# Patient Record
Sex: FEMALE | Race: WHITE | NOT HISPANIC OR LATINO | Employment: OTHER | URBAN - METROPOLITAN AREA
[De-identification: names, ages, dates, MRNs, and addresses within clinical notes are randomized per-mention and may not be internally consistent; named-entity substitution may affect disease eponyms.]

---

## 2022-04-07 ENCOUNTER — HISTORICAL (OUTPATIENT)
Dept: ADMINISTRATIVE | Facility: HOSPITAL | Age: 82
End: 2022-04-07

## 2022-04-07 LAB
ABS NEUT (OLG): 3.21 (ref 2.1–9.2)
BASOPHILS # BLD AUTO: 0 10*3/UL (ref 0–0.2)
BASOPHILS NFR BLD AUTO: 1 %
BUN SERPL-MCNC: 16.5 MG/DL (ref 9.8–20.1)
CALCIUM SERPL-MCNC: 9.5 MG/DL (ref 8.7–10.5)
CHLORIDE SERPL-SCNC: 108 MMOL/L (ref 98–107)
CHOLEST SERPL-MCNC: 213 MG/DL
CHOLEST/HDLC SERPL: 3 {RATIO} (ref 0–5)
CO2 SERPL-SCNC: 26 MMOL/L (ref 23–31)
CREAT SERPL-MCNC: 0.65 MG/DL (ref 0.55–1.02)
CREAT/UREA NIT SERPL: 25
EOSINOPHIL # BLD AUTO: 0.2 10*3/UL (ref 0–0.9)
EOSINOPHIL NFR BLD AUTO: 4 %
ERYTHROCYTE [DISTWIDTH] IN BLOOD BY AUTOMATED COUNT: 12.9 % (ref 11.5–17)
GLUCOSE SERPL-MCNC: 91 MG/DL (ref 82–115)
HCT VFR BLD AUTO: 44.7 % (ref 37–47)
HDLC SERPL-MCNC: 69 MG/DL (ref 35–60)
HEMOLYSIS INTERF INDEX SERPL-ACNC: 20
HGB BLD-MCNC: 14.7 G/DL (ref 12–16)
ICTERIC INTERF INDEX SERPL-ACNC: 1
LDLC SERPL CALC-MCNC: 129 MG/DL (ref 50–140)
LIPEMIC INTERF INDEX SERPL-ACNC: 1
LYMPHOCYTES # BLD AUTO: 1.5 10*3/UL (ref 0.6–4.6)
LYMPHOCYTES NFR BLD AUTO: 27 %
MANUAL DIFF? (OHS): NO
MCH RBC QN AUTO: 30.4 PG (ref 27–31)
MCHC RBC AUTO-ENTMCNC: 32.9 G/DL (ref 33–36)
MCV RBC AUTO: 92.5 FL (ref 80–94)
MONOCYTES # BLD AUTO: 0.5 10*3/UL (ref 0.1–1.3)
MONOCYTES NFR BLD AUTO: 8 %
NEUTROPHILS # BLD AUTO: 3.21 10*3/UL (ref 2.1–9.2)
NEUTROPHILS NFR BLD AUTO: 59 %
PLATELET # BLD AUTO: 129 10*3/UL (ref 130–400)
PMV BLD AUTO: 10.6 FL (ref 9.4–12.4)
POTASSIUM SERPL-SCNC: 4.6 MMOL/L (ref 3.5–5.1)
RBC # BLD AUTO: 4.83 10*6/UL (ref 4.2–5.4)
SODIUM SERPL-SCNC: 143 MMOL/L (ref 136–145)
TRIGL SERPL-MCNC: 75 MG/DL (ref 37–140)
VLDLC SERPL CALC-MCNC: 15 MG/DL
WBC # SPEC AUTO: 5.4 10*3/UL (ref 4.5–11.5)

## 2022-04-19 ENCOUNTER — HISTORICAL (OUTPATIENT)
Dept: RADIOLOGY | Facility: HOSPITAL | Age: 82
End: 2022-04-19

## 2022-05-11 DIAGNOSIS — N30.90 CYSTITIS: Primary | ICD-10-CM

## 2022-05-11 RX ORDER — ALENDRONATE SODIUM 70 MG/1
70 TABLET ORAL
Qty: 4 TABLET | Refills: 3 | Status: SHIPPED | OUTPATIENT
Start: 2022-05-11 | End: 2022-06-09 | Stop reason: SDUPTHER

## 2022-05-12 RX ORDER — ESCITALOPRAM OXALATE 10 MG/1
10 TABLET ORAL DAILY
Qty: 30 TABLET | Refills: 1 | Status: SHIPPED | OUTPATIENT
Start: 2022-05-12 | End: 2022-06-09 | Stop reason: SDUPTHER

## 2022-05-12 NOTE — PROGRESS NOTES
Patient called, states she is experiencing depression due to patient's  being severely ill. Would like an antidepressant to be prescribed  Will send to pharmacy and patient will follow up in clinic when able to.

## 2022-06-09 ENCOUNTER — OFFICE VISIT (OUTPATIENT)
Dept: INTERNAL MEDICINE | Facility: CLINIC | Age: 82
End: 2022-06-09
Payer: MEDICARE

## 2022-06-09 VITALS
WEIGHT: 155 LBS | DIASTOLIC BLOOD PRESSURE: 58 MMHG | BODY MASS INDEX: 24.33 KG/M2 | RESPIRATION RATE: 18 BRPM | OXYGEN SATURATION: 95 % | HEIGHT: 67 IN | HEART RATE: 73 BPM | SYSTOLIC BLOOD PRESSURE: 124 MMHG

## 2022-06-09 DIAGNOSIS — M81.8 OTHER OSTEOPOROSIS WITHOUT CURRENT PATHOLOGICAL FRACTURE: ICD-10-CM

## 2022-06-09 DIAGNOSIS — F51.01 PRIMARY INSOMNIA: Chronic | ICD-10-CM

## 2022-06-09 DIAGNOSIS — F41.9 ANXIETY: ICD-10-CM

## 2022-06-09 DIAGNOSIS — R32 URINARY INCONTINENCE, UNSPECIFIED TYPE: ICD-10-CM

## 2022-06-09 PROCEDURE — 1160F RVW MEDS BY RX/DR IN RCRD: CPT | Mod: CPTII,,, | Performed by: STUDENT IN AN ORGANIZED HEALTH CARE EDUCATION/TRAINING PROGRAM

## 2022-06-09 PROCEDURE — 99214 PR OFFICE/OUTPT VISIT, EST, LEVL IV, 30-39 MIN: ICD-10-PCS | Mod: ,,, | Performed by: STUDENT IN AN ORGANIZED HEALTH CARE EDUCATION/TRAINING PROGRAM

## 2022-06-09 PROCEDURE — 1101F PT FALLS ASSESS-DOCD LE1/YR: CPT | Mod: CPTII,,, | Performed by: STUDENT IN AN ORGANIZED HEALTH CARE EDUCATION/TRAINING PROGRAM

## 2022-06-09 PROCEDURE — 1159F PR MEDICATION LIST DOCUMENTED IN MEDICAL RECORD: ICD-10-PCS | Mod: CPTII,,, | Performed by: STUDENT IN AN ORGANIZED HEALTH CARE EDUCATION/TRAINING PROGRAM

## 2022-06-09 PROCEDURE — 1160F PR REVIEW ALL MEDS BY PRESCRIBER/CLIN PHARMACIST DOCUMENTED: ICD-10-PCS | Mod: CPTII,,, | Performed by: STUDENT IN AN ORGANIZED HEALTH CARE EDUCATION/TRAINING PROGRAM

## 2022-06-09 PROCEDURE — 1101F PR PT FALLS ASSESS DOC 0-1 FALLS W/OUT INJ PAST YR: ICD-10-PCS | Mod: CPTII,,, | Performed by: STUDENT IN AN ORGANIZED HEALTH CARE EDUCATION/TRAINING PROGRAM

## 2022-06-09 PROCEDURE — 3288F PR FALLS RISK ASSESSMENT DOCUMENTED: ICD-10-PCS | Mod: CPTII,,, | Performed by: STUDENT IN AN ORGANIZED HEALTH CARE EDUCATION/TRAINING PROGRAM

## 2022-06-09 PROCEDURE — 3078F PR MOST RECENT DIASTOLIC BLOOD PRESSURE < 80 MM HG: ICD-10-PCS | Mod: CPTII,,, | Performed by: STUDENT IN AN ORGANIZED HEALTH CARE EDUCATION/TRAINING PROGRAM

## 2022-06-09 PROCEDURE — 3074F SYST BP LT 130 MM HG: CPT | Mod: CPTII,,, | Performed by: STUDENT IN AN ORGANIZED HEALTH CARE EDUCATION/TRAINING PROGRAM

## 2022-06-09 PROCEDURE — 3288F FALL RISK ASSESSMENT DOCD: CPT | Mod: CPTII,,, | Performed by: STUDENT IN AN ORGANIZED HEALTH CARE EDUCATION/TRAINING PROGRAM

## 2022-06-09 PROCEDURE — 3078F DIAST BP <80 MM HG: CPT | Mod: CPTII,,, | Performed by: STUDENT IN AN ORGANIZED HEALTH CARE EDUCATION/TRAINING PROGRAM

## 2022-06-09 PROCEDURE — 3074F PR MOST RECENT SYSTOLIC BLOOD PRESSURE < 130 MM HG: ICD-10-PCS | Mod: CPTII,,, | Performed by: STUDENT IN AN ORGANIZED HEALTH CARE EDUCATION/TRAINING PROGRAM

## 2022-06-09 PROCEDURE — 1159F MED LIST DOCD IN RCRD: CPT | Mod: CPTII,,, | Performed by: STUDENT IN AN ORGANIZED HEALTH CARE EDUCATION/TRAINING PROGRAM

## 2022-06-09 PROCEDURE — 99214 OFFICE O/P EST MOD 30 MIN: CPT | Mod: ,,, | Performed by: STUDENT IN AN ORGANIZED HEALTH CARE EDUCATION/TRAINING PROGRAM

## 2022-06-09 RX ORDER — ESCITALOPRAM OXALATE 10 MG/1
10 TABLET ORAL DAILY
Qty: 30 TABLET | Refills: 3 | Status: SHIPPED | OUTPATIENT
Start: 2022-06-09 | End: 2022-08-30 | Stop reason: SDUPTHER

## 2022-06-09 RX ORDER — ALENDRONATE SODIUM 70 MG/1
70 TABLET ORAL
Qty: 4 TABLET | Refills: 3 | Status: SHIPPED | OUTPATIENT
Start: 2022-06-09 | End: 2023-06-09

## 2022-06-09 NOTE — PROGRESS NOTES
Subjective:      Penny Azul  06/09/2022  [id number]    Laura Long MD  Patient Care Team:  Laura Drummond MD as PCP - General (Internal Medicine)          Visit Type:a scheduled routine follow-up visit    Chief Complaint: Follow-up    HPI   Ms Francis presents for follow up. Patient has been under a lot of stress due to her  beng sick, she is the caregiver for her . Has been experiencing increased anxiety, was started on Lexapro and states it is working well. She also started seeing a therapist. Patient is also experiencing insomnia in part due to incontinence, would like to be referred to specialist in [place].     No past medical history on file.  No past surgical history on file.  No family history on file.  Social History     Tobacco Use    Smoking status: Never Smoker    Smokeless tobacco: Never Used   Substance and Sexual Activity    Alcohol use: Yes     Alcohol/week: 1.0 standard drink     Types: 1 Glasses of wine per week    Drug use: Never    Sexual activity: Not on file     Active Problem List with Overview Notes    Diagnosis Date Noted    Other osteoporosis without current pathological fracture 06/09/2022    Urinary incontinence 06/09/2022    Anxiety 06/09/2022    Primary insomnia 02/10/2021     Review of patient's allergies indicates:  No Known Allergies    The following were reviewed at this visit: active problem list, medication list, allergies, family history, social history, and health maintenance.    Medications:    Current Outpatient Medications:     alendronate (FOSAMAX) 70 MG tablet, Take 1 tablet (70 mg total) by mouth every 7 days., Disp: 4 tablet, Rfl: 3    EScitalopram oxalate (LEXAPRO) 10 MG tablet, Take 1 tablet (10 mg total) by mouth once daily., Disp: 30 tablet, Rfl: 3      Medications have been reviewed and reconciled with patient at this visit.  Barriers to medications reviewed with patient.    Adverse reactions to current medications reviewed  "with patient..    Over the counter medications reviewed and reconciled with patient.  Review of Systems   Constitutional: Negative for chills, fever, malaise/fatigue and weight loss.   HENT: Negative for congestion, ear discharge, ear pain, hearing loss, sinus pain and sore throat.    Eyes: Negative for photophobia, pain, discharge and redness.   Respiratory: Negative for cough, shortness of breath and wheezing.    Cardiovascular: Negative for chest pain, palpitations and leg swelling.   Gastrointestinal: Negative for constipation, diarrhea, heartburn, nausea and vomiting.   Genitourinary: Negative for dysuria, frequency and urgency.   Musculoskeletal: Negative for falls, joint pain and myalgias.   Skin: Negative for itching and rash.   Neurological: Negative for dizziness, focal weakness, weakness and headaches.   Psychiatric/Behavioral: Negative for depression and memory loss. The patient is not nervous/anxious and does not have insomnia.            Objective:      Vitals:    06/09/22 1118   BP: (!) 124/58   Pulse: 73   Resp: 18   SpO2: 95%   Weight: 70.3 kg (155 lb)   Height: 5' 7" (1.702 m)       Physical Exam  Constitutional:       General: She is not in acute distress.     Appearance: Normal appearance.   HENT:      Head: Normocephalic and atraumatic.   Eyes:      Extraocular Movements: Extraocular movements intact.      Pupils: Pupils are equal, round, and reactive to light.   Cardiovascular:      Rate and Rhythm: Normal rate and regular rhythm.      Pulses: Normal pulses.      Heart sounds: Murmur heard.    Systolic murmur is present with a grade of 3/6.    No friction rub. No gallop.   Pulmonary:      Effort: Pulmonary effort is normal.      Breath sounds: Normal breath sounds. No wheezing, rhonchi or rales.   Abdominal:      General: Abdomen is flat. Bowel sounds are normal. There is no distension.      Palpations: Abdomen is soft.      Tenderness: There is no abdominal tenderness.   Musculoskeletal:       "   General: No swelling or tenderness. Normal range of motion.      Cervical back: Normal range of motion and neck supple. No tenderness.      Right lower leg: No edema.      Left lower leg: No edema.   Lymphadenopathy:      Cervical: No cervical adenopathy.   Skin:     Findings: No lesion or rash.   Neurological:      General: No focal deficit present.      Mental Status: She is alert and oriented to person, place, and time.      Cranial Nerves: No cranial nerve deficit.      Sensory: No sensory deficit.      Motor: No weakness.   Psychiatric:         Mood and Affect: Mood normal.         Behavior: Behavior normal.         Thought Content: Thought content normal.           Laboratory Reviewed ({Yes)  Lab Results   Component Value Date    WBC 5.4 04/07/2022    HGB 14.7 04/07/2022    HCT 44.7 04/07/2022     04/07/2022    CHOL 213 04/07/2022    TRIG 75 04/07/2022    HDL 69 04/07/2022     04/07/2022    K 4.6 04/07/2022    CREATININE 0.65 04/07/2022    BUN 16.5 04/07/2022    CO2 26 04/07/2022         Assessment and Plan:       Problem List Items Addressed This Visit        Psychiatric    Anxiety (Chronic)     Continue Lexapro 10 mg QD  Follow up in 6 months               Renal/    Urinary incontinence (Chronic)     Would like to be referred to Urologist in [place]  Will send order            Relevant Orders    Ambulatory referral/consult to Urology       Orthopedic    Other osteoporosis without current pathological fracture (Chronic)     Continue on alendronate  Has not experienced side effects               Other    Primary insomnia (Chronic)     Will try melatonin  Will call clinic if it does not help                    Care Plan/Goals: Reviewed    Goals    None         Follow up: Follow up in about 6 months (around 12/9/2022) for Medication f/u.    Orders Placed This Encounter   Procedures    Ambulatory referral/consult to Urology     Standing Status:   Future     Standing Expiration Date:   7/9/2023      Referral Priority:   Routine     Referral Type:   Consultation     Referral Reason:   Specialty Services Required     Requested Specialty:   Urology     Number of Visits Requested:   1

## 2022-07-12 ENCOUNTER — TELEPHONE (OUTPATIENT)
Dept: INTERNAL MEDICINE | Facility: CLINIC | Age: 82
End: 2022-07-12
Payer: MEDICARE

## 2022-07-12 NOTE — TELEPHONE ENCOUNTER
----- Message from Isabel Diaz sent at 7/11/2022  4:46 PM CDT -----  Regarding: Covid  Contact: [phone]  Patient tested positive for Covid    No answer.

## 2022-07-29 DIAGNOSIS — Z12.11 SCREENING FOR COLON CANCER: Primary | ICD-10-CM

## 2022-08-16 ENCOUNTER — TELEPHONE (OUTPATIENT)
Dept: INTERNAL MEDICINE | Facility: CLINIC | Age: 82
End: 2022-08-16
Payer: MEDICARE

## 2022-08-16 DIAGNOSIS — Z12.11 SCREENING FOR COLON CANCER: Primary | ICD-10-CM

## 2022-08-16 NOTE — TELEPHONE ENCOUNTER
----- Message from Isabel Diaz sent at 8/16/2022  9:49 AM CDT -----  Regarding: cologuard order  Patient has not received Cologuard kit.  Please reorder.  Thanks.

## 2022-08-30 DIAGNOSIS — F41.9 ANXIETY: Primary | ICD-10-CM

## 2022-08-30 RX ORDER — ESCITALOPRAM OXALATE 10 MG/1
10 TABLET ORAL DAILY
Qty: 30 TABLET | Refills: 3 | Status: SHIPPED | OUTPATIENT
Start: 2022-08-30 | End: 2022-10-11 | Stop reason: SDUPTHER

## 2022-10-11 ENCOUNTER — TELEPHONE (OUTPATIENT)
Dept: INTERNAL MEDICINE | Facility: CLINIC | Age: 82
End: 2022-10-11
Payer: MEDICARE

## 2022-10-11 DIAGNOSIS — F41.9 ANXIETY: ICD-10-CM

## 2022-10-11 RX ORDER — ESCITALOPRAM OXALATE 20 MG/1
20 TABLET ORAL DAILY
Qty: 30 TABLET | Refills: 3 | Status: SHIPPED | OUTPATIENT
Start: 2022-10-11

## 2022-10-11 NOTE — TELEPHONE ENCOUNTER
----- Message from Isabela Harris LPN sent at 10/11/2022 12:42 PM CDT -----  Regarding: FW: INCREASE MED DOSE- LEXAPRO    ----- Message -----  From: Shane Aquino  Sent: 10/11/2022  11:07 AM CDT  To: Isabela Harris LPN  Subject: INCREASE MED DOSE- LEXAPRO                       MEDICATION       PATIENT PHONE #:  [phone]     : 1940          MESSAGE:  PATIENT CALLED AND STATED HER THERAPIST SUGGESTED TO INCREASE:    LEXAPRO     PATIENT IS CURRENTLY ON LEXAPRO 10 MG, BUT NEEDS DOSE INCREASED

## 2022-12-09 ENCOUNTER — OFFICE VISIT (OUTPATIENT)
Dept: INTERNAL MEDICINE | Facility: CLINIC | Age: 82
End: 2022-12-09
Payer: MEDICARE

## 2022-12-09 VITALS
HEIGHT: 67 IN | TEMPERATURE: 98 F | BODY MASS INDEX: 25.74 KG/M2 | OXYGEN SATURATION: 96 % | RESPIRATION RATE: 18 BRPM | SYSTOLIC BLOOD PRESSURE: 122 MMHG | HEART RATE: 63 BPM | DIASTOLIC BLOOD PRESSURE: 64 MMHG | WEIGHT: 164 LBS

## 2022-12-09 DIAGNOSIS — K64.4 ANAL SKIN TAG: ICD-10-CM

## 2022-12-09 DIAGNOSIS — M81.8 OTHER OSTEOPOROSIS WITHOUT CURRENT PATHOLOGICAL FRACTURE: Chronic | ICD-10-CM

## 2022-12-09 DIAGNOSIS — F41.9 ANXIETY: Primary | Chronic | ICD-10-CM

## 2022-12-09 DIAGNOSIS — R01.1 MURMUR: ICD-10-CM

## 2022-12-09 PROCEDURE — 3078F DIAST BP <80 MM HG: CPT | Mod: CPTII,,, | Performed by: STUDENT IN AN ORGANIZED HEALTH CARE EDUCATION/TRAINING PROGRAM

## 2022-12-09 PROCEDURE — 3078F PR MOST RECENT DIASTOLIC BLOOD PRESSURE < 80 MM HG: ICD-10-PCS | Mod: CPTII,,, | Performed by: STUDENT IN AN ORGANIZED HEALTH CARE EDUCATION/TRAINING PROGRAM

## 2022-12-09 PROCEDURE — 99214 OFFICE O/P EST MOD 30 MIN: CPT | Mod: ,,, | Performed by: STUDENT IN AN ORGANIZED HEALTH CARE EDUCATION/TRAINING PROGRAM

## 2022-12-09 PROCEDURE — 1101F PT FALLS ASSESS-DOCD LE1/YR: CPT | Mod: CPTII,,, | Performed by: STUDENT IN AN ORGANIZED HEALTH CARE EDUCATION/TRAINING PROGRAM

## 2022-12-09 PROCEDURE — 3074F PR MOST RECENT SYSTOLIC BLOOD PRESSURE < 130 MM HG: ICD-10-PCS | Mod: CPTII,,, | Performed by: STUDENT IN AN ORGANIZED HEALTH CARE EDUCATION/TRAINING PROGRAM

## 2022-12-09 PROCEDURE — 1101F PR PT FALLS ASSESS DOC 0-1 FALLS W/OUT INJ PAST YR: ICD-10-PCS | Mod: CPTII,,, | Performed by: STUDENT IN AN ORGANIZED HEALTH CARE EDUCATION/TRAINING PROGRAM

## 2022-12-09 PROCEDURE — 1126F PR PAIN SEVERITY QUANTIFIED, NO PAIN PRESENT: ICD-10-PCS | Mod: CPTII,,, | Performed by: STUDENT IN AN ORGANIZED HEALTH CARE EDUCATION/TRAINING PROGRAM

## 2022-12-09 PROCEDURE — 99214 PR OFFICE/OUTPT VISIT, EST, LEVL IV, 30-39 MIN: ICD-10-PCS | Mod: ,,, | Performed by: STUDENT IN AN ORGANIZED HEALTH CARE EDUCATION/TRAINING PROGRAM

## 2022-12-09 PROCEDURE — 3288F PR FALLS RISK ASSESSMENT DOCUMENTED: ICD-10-PCS | Mod: CPTII,,, | Performed by: STUDENT IN AN ORGANIZED HEALTH CARE EDUCATION/TRAINING PROGRAM

## 2022-12-09 PROCEDURE — 3074F SYST BP LT 130 MM HG: CPT | Mod: CPTII,,, | Performed by: STUDENT IN AN ORGANIZED HEALTH CARE EDUCATION/TRAINING PROGRAM

## 2022-12-09 PROCEDURE — 3288F FALL RISK ASSESSMENT DOCD: CPT | Mod: CPTII,,, | Performed by: STUDENT IN AN ORGANIZED HEALTH CARE EDUCATION/TRAINING PROGRAM

## 2022-12-09 PROCEDURE — 1126F AMNT PAIN NOTED NONE PRSNT: CPT | Mod: CPTII,,, | Performed by: STUDENT IN AN ORGANIZED HEALTH CARE EDUCATION/TRAINING PROGRAM

## 2022-12-09 RX ORDER — ESTRADIOL 10 UG/1
1 INSERT VAGINAL
COMMUNITY
Start: 2022-08-16

## 2022-12-09 NOTE — PROGRESS NOTES
Subjective:      Penny Azul  12/09/2022  [id number]      Chief Complaint: Follow-up       HPI:  Ms Francis presents for follow up. Patient is doing well, no complaints today, states her anxiety has improved since increasing dose of Lexapro. Patient would like a referral to GI due to an anal skin tag.     No past medical history on file.  No past surgical history on file.  No family history on file.  Social History     Tobacco Use    Smoking status: Never    Smokeless tobacco: Never   Substance and Sexual Activity    Alcohol use: Yes     Alcohol/week: 1.0 standard drink     Types: 1 Glasses of wine per week    Drug use: Never    Sexual activity: Not on file     Review of patient's allergies indicates:  No Known Allergies    The following were reviewed at this visit: active problem list, medication list, allergies, family history, social history, and health maintenance.    Medications:    Current Outpatient Medications:     estradioL (VAGIFEM) 10 mcg Tab, Place 1 tablet vaginally., Disp: , Rfl:     alendronate (FOSAMAX) 70 MG tablet, Take 1 tablet (70 mg total) by mouth every 7 days., Disp: 4 tablet, Rfl: 3    EScitalopram oxalate (LEXAPRO) 20 MG tablet, Take 1 tablet (20 mg total) by mouth once daily., Disp: 30 tablet, Rfl: 3      Medications have been reviewed and reconciled with patient at this visit.  Barriers to medications reviewed with patient.    Adverse reactions to current medications reviewed with patient..    Over the counter medications reviewed and reconciled with patient.  Review of Systems   Constitutional:  Negative for chills, fever, malaise/fatigue and weight loss.   HENT:  Negative for congestion, ear discharge, ear pain, hearing loss, sinus pain and sore throat.    Eyes:  Negative for photophobia, pain, discharge and redness.   Respiratory:  Negative for cough, shortness of breath and wheezing.    Cardiovascular:  Negative for chest pain, palpitations and leg swelling.   Gastrointestinal:  Negative  "for constipation, diarrhea, heartburn, nausea and vomiting.   Genitourinary:  Negative for dysuria, frequency and urgency.   Musculoskeletal:  Negative for falls, joint pain and myalgias.   Skin:  Negative for itching and rash.   Neurological:  Negative for dizziness, focal weakness, weakness and headaches.   Psychiatric/Behavioral:  Negative for depression and memory loss. The patient is not nervous/anxious and does not have insomnia.          Objective:      Vitals:    12/09/22 1006   BP: 122/64   BP Location: Left arm   Pulse: 63   Resp: 18   Temp: 98 °F (36.7 °C)   TempSrc: Oral   SpO2: 96%   Weight: 74.4 kg (164 lb)   Height: 5' 7" (1.702 m)       Physical Exam  Constitutional:       General: She is not in acute distress.     Appearance: Normal appearance.   HENT:      Head: Normocephalic and atraumatic.   Eyes:      Extraocular Movements: Extraocular movements intact.      Pupils: Pupils are equal, round, and reactive to light.   Cardiovascular:      Rate and Rhythm: Normal rate and regular rhythm.      Pulses: Normal pulses.      Heart sounds: Murmur heard.     No friction rub. No gallop.   Pulmonary:      Effort: Pulmonary effort is normal.      Breath sounds: Normal breath sounds. No wheezing, rhonchi or rales.   Abdominal:      General: Abdomen is flat. Bowel sounds are normal. There is no distension.      Palpations: Abdomen is soft.      Tenderness: There is no abdominal tenderness.   Musculoskeletal:         General: No swelling or tenderness. Normal range of motion.      Cervical back: Normal range of motion and neck supple. No tenderness.      Right lower leg: No edema.      Left lower leg: No edema.   Lymphadenopathy:      Cervical: No cervical adenopathy.   Skin:     Findings: No lesion or rash.   Neurological:      General: No focal deficit present.      Mental Status: She is alert and oriented to person, place, and time.      Cranial Nerves: No cranial nerve deficit.      Sensory: No sensory " deficit.      Motor: No weakness.   Psychiatric:         Mood and Affect: Mood normal.         Behavior: Behavior normal.         Thought Content: Thought content normal.             Assessment and Plan:       1. Anxiety  Assessment & Plan:  Controlled  Continue Lexapro       2. Other osteoporosis without current pathological fracture  Assessment & Plan:  No side effects to alendronate  Continue current medication       3. Murmur  Assessment & Plan:  Will obtain Echo for evaluation     Orders:  -     Echo; Future    4. Anal skin tag  -     Ambulatory referral/consult to Gastroenterology; Future; Expected date: 12/16/2022          Follow up: Follow up in about 5 months (around 5/9/2023) for wellness, Labs check.

## 2022-12-21 ENCOUNTER — HOSPITAL ENCOUNTER (OUTPATIENT)
Dept: CARDIOLOGY | Facility: HOSPITAL | Age: 82
Discharge: HOME | End: 2022-12-21
Attending: STUDENT IN AN ORGANIZED HEALTH CARE EDUCATION/TRAINING PROGRAM
Payer: MEDICARE

## 2022-12-21 DIAGNOSIS — R01.1 MURMUR: ICD-10-CM

## 2022-12-21 LAB
AV INDEX (PROSTH): 0.34
AV MEAN GRADIENT: 21 MMHG
AV PEAK GRADIENT: 37 MMHG
AV VALVE AREA: 1.28 CM2
AV VELOCITY RATIO: 0.36
CV ECHO LV RWT: 0.43 CM
DOP CALC AO PEAK VEL: 3.06 M/S
DOP CALC AO VTI: 75.4 CM
DOP CALC LVOT AREA: 3.8 CM2
DOP CALC LVOT DIAMETER: 2.2 CM
DOP CALC LVOT PEAK VEL: 1.11 M/S
DOP CALC LVOT STROKE VOLUME: 96.5 CM3
DOP CALC MV VTI: 46.4 CM
DOP CALCLVOT PEAK VEL VTI: 25.4 CM
E WAVE DECELERATION TIME: 290 MSEC
E/A RATIO: 0.79
E/E' RATIO: 11.44 M/S
ECHO LV POSTERIOR WALL: 0.96 CM (ref 0.6–1.1)
EJECTION FRACTION: 55 %
FRACTIONAL SHORTENING: 27 % (ref 28–44)
INTERVENTRICULAR SEPTUM: 1.25 CM (ref 0.6–1.1)
LEFT ATRIUM SIZE: 4.5 CM
LEFT ATRIUM VOLUME MOD: 42.8 CM3
LEFT INTERNAL DIMENSION IN SYSTOLE: 3.26 CM (ref 2.1–4)
LEFT VENTRICLE DIASTOLIC VOLUME: 92 ML
LEFT VENTRICLE SYSTOLIC VOLUME: 42.8 ML
LEFT VENTRICULAR INTERNAL DIMENSION IN DIASTOLE: 4.49 CM (ref 3.5–6)
LEFT VENTRICULAR MASS: 175.52 G
LV LATERAL E/E' RATIO: 10.3 M/S
LV SEPTAL E/E' RATIO: 12.88 M/S
LVOT MG: 3 MMHG
LVOT MV: 0.77 CM/S
MV MEAN GRADIENT: 3 MMHG
MV PEAK A VEL: 1.3 M/S
MV PEAK E VEL: 1.03 M/S
MV PEAK GRADIENT: 7 MMHG
MV STENOSIS PRESSURE HALF TIME: 93 MS
MV VALVE AREA BY CONTINUITY EQUATION: 2.08 CM2
MV VALVE AREA P 1/2 METHOD: 2.37 CM2
PISA MRMAX VEL: 3.37 M/S
PISA TR MAX VEL: 1.81 M/S
PV PEAK VELOCITY: 1.07 CM/S
RA PRESSURE: 3 MMHG
RIGHT VENTRICULAR END-DIASTOLIC DIMENSION: 2.3 CM
TDI LATERAL: 0.1 M/S
TDI SEPTAL: 0.08 M/S
TDI: 0.09 M/S
TR MAX PG: 13 MMHG
TRICUSPID ANNULAR PLANE SYSTOLIC EXCURSION: 3.69 CM
TV REST PULMONARY ARTERY PRESSURE: 16 MMHG

## 2022-12-21 PROCEDURE — 93306 ECHO (CUPID ONLY): ICD-10-PCS | Mod: 26,,, | Performed by: INTERNAL MEDICINE

## 2022-12-21 PROCEDURE — 93306 TTE W/DOPPLER COMPLETE: CPT

## 2022-12-21 PROCEDURE — 93306 TTE W/DOPPLER COMPLETE: CPT | Mod: 26,,, | Performed by: INTERNAL MEDICINE

## 2023-01-24 ENCOUNTER — TELEPHONE (OUTPATIENT)
Dept: INTERNAL MEDICINE | Facility: CLINIC | Age: 83
End: 2023-01-24
Payer: MEDICARE

## 2023-01-24 NOTE — TELEPHONE ENCOUNTER
Patient complaining productive cough, no fever, wanted cough medication,  stated she needs appointment. Patient scheduled for following day.

## 2023-01-25 ENCOUNTER — OFFICE VISIT (OUTPATIENT)
Dept: INTERNAL MEDICINE | Facility: CLINIC | Age: 83
End: 2023-01-25
Payer: MEDICARE

## 2023-01-25 VITALS
BODY MASS INDEX: 26.06 KG/M2 | SYSTOLIC BLOOD PRESSURE: 136 MMHG | OXYGEN SATURATION: 95 % | HEIGHT: 67 IN | DIASTOLIC BLOOD PRESSURE: 78 MMHG | WEIGHT: 166 LBS | HEART RATE: 75 BPM | TEMPERATURE: 99 F

## 2023-01-25 DIAGNOSIS — J06.9 UPPER RESPIRATORY TRACT INFECTION, UNSPECIFIED TYPE: Primary | ICD-10-CM

## 2023-01-25 PROCEDURE — 1160F RVW MEDS BY RX/DR IN RCRD: CPT | Mod: CPTII,,, | Performed by: STUDENT IN AN ORGANIZED HEALTH CARE EDUCATION/TRAINING PROGRAM

## 2023-01-25 PROCEDURE — 3078F DIAST BP <80 MM HG: CPT | Mod: CPTII,,, | Performed by: STUDENT IN AN ORGANIZED HEALTH CARE EDUCATION/TRAINING PROGRAM

## 2023-01-25 PROCEDURE — 99214 OFFICE O/P EST MOD 30 MIN: CPT | Mod: ,,, | Performed by: STUDENT IN AN ORGANIZED HEALTH CARE EDUCATION/TRAINING PROGRAM

## 2023-01-25 PROCEDURE — 1159F MED LIST DOCD IN RCRD: CPT | Mod: CPTII,,, | Performed by: STUDENT IN AN ORGANIZED HEALTH CARE EDUCATION/TRAINING PROGRAM

## 2023-01-25 PROCEDURE — 3075F PR MOST RECENT SYSTOLIC BLOOD PRESS GE 130-139MM HG: ICD-10-PCS | Mod: CPTII,,, | Performed by: STUDENT IN AN ORGANIZED HEALTH CARE EDUCATION/TRAINING PROGRAM

## 2023-01-25 PROCEDURE — 3075F SYST BP GE 130 - 139MM HG: CPT | Mod: CPTII,,, | Performed by: STUDENT IN AN ORGANIZED HEALTH CARE EDUCATION/TRAINING PROGRAM

## 2023-01-25 PROCEDURE — 1159F PR MEDICATION LIST DOCUMENTED IN MEDICAL RECORD: ICD-10-PCS | Mod: CPTII,,, | Performed by: STUDENT IN AN ORGANIZED HEALTH CARE EDUCATION/TRAINING PROGRAM

## 2023-01-25 PROCEDURE — 99214 PR OFFICE/OUTPT VISIT, EST, LEVL IV, 30-39 MIN: ICD-10-PCS | Mod: ,,, | Performed by: STUDENT IN AN ORGANIZED HEALTH CARE EDUCATION/TRAINING PROGRAM

## 2023-01-25 PROCEDURE — 3078F PR MOST RECENT DIASTOLIC BLOOD PRESSURE < 80 MM HG: ICD-10-PCS | Mod: CPTII,,, | Performed by: STUDENT IN AN ORGANIZED HEALTH CARE EDUCATION/TRAINING PROGRAM

## 2023-01-25 PROCEDURE — 1160F PR REVIEW ALL MEDS BY PRESCRIBER/CLIN PHARMACIST DOCUMENTED: ICD-10-PCS | Mod: CPTII,,, | Performed by: STUDENT IN AN ORGANIZED HEALTH CARE EDUCATION/TRAINING PROGRAM

## 2023-01-25 PROCEDURE — 0241U COVID/RSV/FLU A&B PCR: CPT | Performed by: STUDENT IN AN ORGANIZED HEALTH CARE EDUCATION/TRAINING PROGRAM

## 2023-01-25 RX ORDER — CETIRIZINE HYDROCHLORIDE 10 MG/1
10 TABLET ORAL DAILY
COMMUNITY

## 2023-01-26 ENCOUNTER — TELEPHONE (OUTPATIENT)
Dept: INTERNAL MEDICINE | Facility: CLINIC | Age: 83
End: 2023-01-26
Payer: MEDICARE

## 2023-01-26 PROBLEM — J06.9 UPPER RESPIRATORY TRACT INFECTION: Status: ACTIVE | Noted: 2023-01-26

## 2023-01-26 NOTE — TELEPHONE ENCOUNTER
Tried calling patient to let her know of negative COVID/flu, no answer, left voice message to call clinic back

## 2023-01-27 NOTE — ASSESSMENT & PLAN NOTE
Symptoms likely represent URI  Will need to rule out COVID/flu  Swabbed in office, will follow up with results  Continue to take Mucinex DM or Coricidin HBP as needed

## 2023-01-27 NOTE — PROGRESS NOTES
Subjective:      Penny Azul  01/26/2023  [id number]      Chief Complaint: Cough (Started 01/19/22)       HPI:  Ms Francis presents with complaints of congestion, post nasal drip and cough. Patient states she recently had house guests from out of town and she is concerned about possible exposure. No fever, chills, shortness of breath.     Past Medical History:   Diagnosis Date    Anxiety     Heart murmur      Past Surgical History:   Procedure Laterality Date    APPENDECTOMY      FACIAL COSMETIC SURGERY      HYSTERECTOMY      left thigh  Left     MODIFIED RADICAL MASTECTOMY WITH RECONSTRUCTION USING FLAP Bilateral      History reviewed. No pertinent family history.  Social History     Tobacco Use    Smoking status: Never    Smokeless tobacco: Never   Substance and Sexual Activity    Alcohol use: Yes     Alcohol/week: 1.0 standard drink     Types: 1 Glasses of wine per week    Drug use: Never    Sexual activity: Not Currently     Review of patient's allergies indicates:  No Known Allergies    The following were reviewed at this visit: active problem list, medication list, allergies, family history, social history, and health maintenance.    Medications:    Current Outpatient Medications:     alendronate (FOSAMAX) 70 MG tablet, Take 1 tablet (70 mg total) by mouth every 7 days., Disp: 4 tablet, Rfl: 3    cetirizine (ZYRTEC) 10 MG tablet, Take 10 mg by mouth once daily., Disp: , Rfl:     EScitalopram oxalate (LEXAPRO) 20 MG tablet, Take 1 tablet (20 mg total) by mouth once daily., Disp: 30 tablet, Rfl: 3    estradioL (VAGIFEM) 10 mcg Tab, Place 1 tablet vaginally., Disp: , Rfl:       Medications have been reviewed and reconciled with patient at this visit.  Barriers to medications reviewed with patient.    Adverse reactions to current medications reviewed with patient..    Over the counter medications reviewed and reconciled with patient.  Review of Systems   Constitutional:  Negative for chills, fever, malaise/fatigue  "and weight loss.   HENT:  Negative for congestion, ear discharge, ear pain, hearing loss, sinus pain and sore throat.    Eyes:  Negative for photophobia, pain, discharge and redness.   Respiratory:  Negative for cough, shortness of breath and wheezing.    Cardiovascular:  Negative for chest pain, palpitations and leg swelling.   Gastrointestinal:  Negative for constipation, diarrhea, heartburn, nausea and vomiting.   Genitourinary:  Negative for dysuria, frequency and urgency.   Musculoskeletal:  Negative for falls, joint pain and myalgias.   Skin:  Negative for itching and rash.   Neurological:  Negative for dizziness, focal weakness, weakness and headaches.   Psychiatric/Behavioral:  Negative for depression and memory loss. The patient is not nervous/anxious and does not have insomnia.          Objective:      Vitals:    01/25/23 1322   BP: 136/78   Pulse: 75   Temp: 98.7 °F (37.1 °C)   TempSrc: Temporal   SpO2: 95%   Weight: 75.3 kg (166 lb)   Height: 5' 7" (1.702 m)       Physical Exam  Constitutional:       General: She is not in acute distress.     Appearance: Normal appearance.   HENT:      Head: Normocephalic and atraumatic.   Eyes:      Extraocular Movements: Extraocular movements intact.      Pupils: Pupils are equal, round, and reactive to light.   Cardiovascular:      Rate and Rhythm: Normal rate and regular rhythm.      Pulses: Normal pulses.      Heart sounds: Normal heart sounds. No murmur heard.    No friction rub. No gallop.   Pulmonary:      Effort: Pulmonary effort is normal.      Breath sounds: Normal breath sounds. No wheezing, rhonchi or rales.   Abdominal:      General: Abdomen is flat. Bowel sounds are normal. There is no distension.      Palpations: Abdomen is soft.      Tenderness: There is no abdominal tenderness.   Musculoskeletal:         General: No swelling or tenderness. Normal range of motion.      Cervical back: Normal range of motion and neck supple. No tenderness.      Right lower " leg: No edema.      Left lower leg: No edema.   Lymphadenopathy:      Cervical: No cervical adenopathy.   Skin:     Findings: No lesion or rash.   Neurological:      General: No focal deficit present.      Mental Status: She is alert and oriented to person, place, and time.      Cranial Nerves: No cranial nerve deficit.      Sensory: No sensory deficit.      Motor: No weakness.   Psychiatric:         Mood and Affect: Mood normal.         Behavior: Behavior normal.         Thought Content: Thought content normal.             Assessment and Plan:       1. Upper respiratory tract infection, unspecified type  Assessment & Plan:  Symptoms likely represent URI  Will need to rule out COVID/flu  Swabbed in office, will follow up with results  Continue to take Mucinex DM or Coricidin HBP as needed    Orders:  -     COVID/RSV/FLU A&B PCR; Future; Expected date: 01/25/2023            Follow up: Follow up as scheduled

## 2023-02-02 RX ORDER — ACETAMINOPHEN, DIPHENHYDRAMINE HCL, PHENYLEPHRINE HCL 325; 25; 5 MG/1; MG/1; MG/1
1 TABLET ORAL NIGHTLY
COMMUNITY

## 2023-02-02 RX ORDER — OMEPRAZOLE 40 MG/1
40 CAPSULE, DELAYED RELEASE ORAL DAILY PRN
COMMUNITY

## 2023-02-02 RX ORDER — SOD SULF/POT CHLORIDE/MAG SULF 1.479 G
12 TABLET ORAL DAILY
COMMUNITY

## 2023-02-02 RX ORDER — METHYLPHENIDATE HYDROCHLORIDE 10 MG/1
10 TABLET ORAL DAILY PRN
COMMUNITY

## 2023-02-03 ENCOUNTER — HOSPITAL ENCOUNTER (OUTPATIENT)
Facility: HOSPITAL | Age: 83
Discharge: HOME | End: 2023-02-03
Attending: INTERNAL MEDICINE | Admitting: INTERNAL MEDICINE
Payer: MEDICARE

## 2023-02-03 ENCOUNTER — ANESTHESIA (OUTPATIENT)
Dept: ENDOSCOPY | Facility: HOSPITAL | Age: 83
End: 2023-02-03
Payer: MEDICARE

## 2023-02-03 ENCOUNTER — ANESTHESIA EVENT (OUTPATIENT)
Dept: ENDOSCOPY | Facility: HOSPITAL | Age: 83
End: 2023-02-03
Payer: MEDICARE

## 2023-02-03 VITALS
RESPIRATION RATE: 15 BRPM | TEMPERATURE: 97 F | WEIGHT: 160 LBS | SYSTOLIC BLOOD PRESSURE: 132 MMHG | DIASTOLIC BLOOD PRESSURE: 69 MMHG | BODY MASS INDEX: 25.11 KG/M2 | HEIGHT: 67 IN | HEART RATE: 67 BPM | OXYGEN SATURATION: 96 %

## 2023-02-03 DIAGNOSIS — R19.7 DIARRHEA, UNSPECIFIED TYPE: ICD-10-CM

## 2023-02-03 DIAGNOSIS — Z01.818 PREOP TESTING: ICD-10-CM

## 2023-02-03 PROCEDURE — 37000009 HC ANESTHESIA EA ADD 15 MINS: Performed by: INTERNAL MEDICINE

## 2023-02-03 PROCEDURE — 45380 COLONOSCOPY AND BIOPSY: CPT | Performed by: INTERNAL MEDICINE

## 2023-02-03 PROCEDURE — 37000008 HC ANESTHESIA 1ST 15 MINUTES: Performed by: INTERNAL MEDICINE

## 2023-02-03 PROCEDURE — 63600175 PHARM REV CODE 636 W HCPCS

## 2023-02-03 PROCEDURE — 93010 ELECTROCARDIOGRAM REPORT: CPT | Mod: ,,, | Performed by: INTERNAL MEDICINE

## 2023-02-03 PROCEDURE — 25000003 PHARM REV CODE 250

## 2023-02-03 PROCEDURE — 93005 ELECTROCARDIOGRAM TRACING: CPT | Mod: 59

## 2023-02-03 PROCEDURE — 27201423 OPTIME MED/SURG SUP & DEVICES STERILE SUPPLY: Performed by: INTERNAL MEDICINE

## 2023-02-03 PROCEDURE — 93010 EKG 12-LEAD: ICD-10-PCS | Mod: ,,, | Performed by: INTERNAL MEDICINE

## 2023-02-03 RX ORDER — METOPROLOL TARTRATE 1 MG/ML
INJECTION, SOLUTION INTRAVENOUS
Status: DISCONTINUED | OUTPATIENT
Start: 2023-02-03 | End: 2023-02-03

## 2023-02-03 RX ORDER — ONDANSETRON 2 MG/ML
INJECTION INTRAMUSCULAR; INTRAVENOUS
Status: COMPLETED
Start: 2023-02-03 | End: 2023-02-03

## 2023-02-03 RX ORDER — SODIUM CHLORIDE, SODIUM GLUCONATE, SODIUM ACETATE, POTASSIUM CHLORIDE AND MAGNESIUM CHLORIDE 30; 37; 368; 526; 502 MG/100ML; MG/100ML; MG/100ML; MG/100ML; MG/100ML
INJECTION, SOLUTION INTRAVENOUS CONTINUOUS
Status: DISCONTINUED | OUTPATIENT
Start: 2023-02-03 | End: 2023-02-03 | Stop reason: HOSPADM

## 2023-02-03 RX ORDER — LIDOCAINE HYDROCHLORIDE 10 MG/ML
1 INJECTION, SOLUTION EPIDURAL; INFILTRATION; INTRACAUDAL; PERINEURAL ONCE
Status: DISCONTINUED | OUTPATIENT
Start: 2023-02-03 | End: 2023-02-03 | Stop reason: HOSPADM

## 2023-02-03 RX ORDER — ONDANSETRON 4 MG/1
4 TABLET, ORALLY DISINTEGRATING ORAL ONCE
Status: CANCELLED | OUTPATIENT
Start: 2023-02-03 | End: 2023-02-03

## 2023-02-03 RX ORDER — PROPOFOL 10 MG/ML
VIAL (ML) INTRAVENOUS
Status: DISCONTINUED | OUTPATIENT
Start: 2023-02-03 | End: 2023-02-03

## 2023-02-03 RX ORDER — LIDOCAINE HYDROCHLORIDE 10 MG/ML
INJECTION, SOLUTION EPIDURAL; INFILTRATION; INTRACAUDAL; PERINEURAL
Status: COMPLETED
Start: 2023-02-03 | End: 2023-02-03

## 2023-02-03 RX ORDER — LIDOCAINE HYDROCHLORIDE 10 MG/ML
INJECTION, SOLUTION EPIDURAL; INFILTRATION; INTRACAUDAL; PERINEURAL
Status: DISCONTINUED | OUTPATIENT
Start: 2023-02-03 | End: 2023-02-03

## 2023-02-03 RX ORDER — METOPROLOL TARTRATE 1 MG/ML
INJECTION, SOLUTION INTRAVENOUS
Status: COMPLETED
Start: 2023-02-03 | End: 2023-02-03

## 2023-02-03 RX ORDER — PROPOFOL 10 MG/ML
VIAL (ML) INTRAVENOUS
Status: COMPLETED
Start: 2023-02-03 | End: 2023-02-03

## 2023-02-03 RX ORDER — PROPOFOL 10 MG/ML
VIAL (ML) INTRAVENOUS
Status: DISCONTINUED
Start: 2023-02-03 | End: 2023-02-03 | Stop reason: HOSPADM

## 2023-02-03 RX ADMIN — PROPOFOL 50 MG: 10 INJECTION, EMULSION INTRAVENOUS at 08:02

## 2023-02-03 RX ADMIN — LIDOCAINE HYDROCHLORIDE 50 MG: 10 INJECTION, SOLUTION EPIDURAL; INFILTRATION; INTRACAUDAL; PERINEURAL at 08:02

## 2023-02-03 RX ADMIN — PROPOFOL 80 MG: 10 INJECTION, EMULSION INTRAVENOUS at 08:02

## 2023-02-03 RX ADMIN — PROPOFOL 20 MG: 10 INJECTION, EMULSION INTRAVENOUS at 08:02

## 2023-02-03 RX ADMIN — SODIUM CHLORIDE, SODIUM GLUCONATE, SODIUM ACETATE, POTASSIUM CHLORIDE AND MAGNESIUM CHLORIDE: 526; 502; 368; 37; 30 INJECTION, SOLUTION INTRAVENOUS at 08:02

## 2023-02-03 RX ADMIN — METOPROLOL TARTRATE 2 MG: 1 INJECTION, SOLUTION INTRAVENOUS at 08:02

## 2023-02-03 RX ADMIN — ONDANSETRON 4 MG: 2 INJECTION INTRAMUSCULAR; INTRAVENOUS at 07:02

## 2023-02-03 RX ADMIN — PROPOFOL 30 MG: 10 INJECTION, EMULSION INTRAVENOUS at 08:02

## 2023-02-03 NOTE — TRANSFER OF CARE
"Anesthesia Transfer of Care Note    Patient: Penny Azul    Procedure(s) Performed: Procedure(s) (LRB):  COLON (N/A)    Patient location: GI    Anesthesia Type: general    Transport from OR: Transported from OR on room air with adequate spontaneous ventilation    Post pain: adequate analgesia    Post assessment: no apparent anesthetic complications    Post vital signs: stable    Level of consciousness: awake and responds to stimulation    Nausea/Vomiting: no nausea/vomiting    Complications: none    Transfer of care protocol was followed      Last vitals:   Visit Vitals  BP (!) 176/86 (BP Location: Left arm, Patient Position: Lying)   Pulse 76   Temp 36.4 °C (97.5 °F) (Temporal)   Resp 20   Ht 5' 7" (1.702 m)   Wt 72.6 kg (160 lb)   SpO2 96%   BMI 25.06 kg/m²     "

## 2023-02-03 NOTE — ANESTHESIA PREPROCEDURE EVALUATION
"                                                                                                             02/03/2023  Penny Azul is a 82 y.o., female.  Who presents with chronic diarrhea.   Diagnosis:   Diarrhea, unspecified type [R19.7]      The pt. comes to [facility] endoscopy for the noted procedure under GA/TIVA.  Procedure:   COLON (Abdomen)    PMHx: (h/o Difficult Intubation)      Other Medical History   Anxiety Heart murmur   Diarrhea      Surgical History:  HYSTERECTOMY MODIFIED RADICAL MASTECTOMY WITH RECONSTRUCTION USING FLAP   APPENDECTOMY left thigh    FACIAL COSMETIC SURGERY Skin reconstructive surgery   COLONOSCOPY            Vital signs:  Pre Vitals     Current as of 02/03/23 0711  BP: 176/86 Pulse: 76   Resp: 20 SpO2: 96   Temp: 36.4 °C (97.5 °F)   Height: 5' 7" (1.702 m) (02/02/23) Weight: 72.6 kg (160 lb) (02/02/23)   BMI: 25.1 IBW: 61.6 kg (135 lb 13.5 oz)   Last edited 02/03/23 0702 by CELSO          Lab Data:  N/A      Echo:      EKG:(02/03/23)  NSR. Left Ant. Fasc. Blk.  LVH w/ QRS widening.  ?Septal infarct, ?age. Rt:70        Pre-op Assessment    I have reviewed the Patient Summary Reports.     I have reviewed the Nursing Notes. I have reviewed the NPO Status.   I have reviewed the Medications.     Review of Systems  Anesthesia Hx:  No problems with previous Anesthesia  Personal Hx of Anesthesia complications  Difficult Intubation  Severe Sore Throat after Anesthesia, Hoarseness after General Anesthesia   Social:  Non-Smoker    Hematology/Oncology:  Hematology Normal   Oncology Normal     EENT/Dental:EENT/Dental Normal   Cardiovascular:  Cardiovascular Normal Exercise tolerance: good  ECG has been reviewed.  Functional Capacity good / => 4 METS    Pulmonary:  Pulmonary Normal    Renal/:  Renal/ Normal     Hepatic/GI:  Hepatic/GI Normal    Musculoskeletal:  Musculoskeletal Normal    Neurological:  Neurology Normal    Endocrine:  Endocrine Normal    Dermatological:  Skin Normal  "   Psych:  Psychiatric Normal           Physical Exam  General: Alert, Oriented, Well nourished and Cooperative    Airway:  Mallampati: II   Mouth Opening: Normal  TM Distance: Normal  Tongue: Normal  Neck ROM: Normal ROM    Dental:  Intact    Chest/Lungs:  Clear to auscultation, Normal Respiratory Rate    Heart:  Rate: Normal  Rhythm: Regular Rhythm        Anesthesia Plan  Type of Anesthesia, risks & benefits discussed:    Anesthesia Type: Gen Natural Airway  Intra-op Monitoring Plan: Standard ASA Monitors  Induction:  IV  Informed Consent: Informed consent signed with the Patient and all parties understand the risks and agree with anesthesia plan.  All questions answered.   ASA Score: 2  Day of Surgery Review of History & Physical: H&P Update referred to the surgeon/provider.    Ready For Surgery From Anesthesia Perspective.     .

## 2023-02-03 NOTE — H&P
"[facility]    CC: diarrhea    HPI 82 y.o. female with diarrhea.  No bleeding etc.     Past Medical History  Past Medical History:   Diagnosis Date    Anxiety     Diarrhea     Heart murmur          Review of Systems  General ROS: negative for chills, fever or weight loss  Cardiovascular ROS: no chest pain or dyspnea on exertion  Gastrointestinal ROS: no abdominal pain, change in bowel habits, or black/ bloody stools    Physical Examination  BP (!) 176/86 (BP Location: Left arm, Patient Position: Lying)   Pulse 76   Temp 97.5 °F (36.4 °C) (Temporal)   Resp 20   Ht 5' 7" (1.702 m)   Wt 72.6 kg (160 lb)   SpO2 96% Comment: Room air  BMI 25.06 kg/m²   General appearance: alert, cooperative, no distress  HENT: Normocephalic, atraumatic, neck symmetrical, no nasal discharge   Lungs: clear to auscultation bilaterally, no dullness to percussion bilaterally  Heart: regular rate and rhythm without rub; no displacement of the PMI   Abdomen: soft, non-tender; bowel sounds normoactive; no organomegaly  Extremities: extremities symmetric; no clubbing, cyanosis, or edema  Neurologic: Alert and oriented X 3, normal strength, normal coordination and gait    Labs:    Imaging:    I have personally reviewed and interpreted these images.    Assessment:   diarrhea    Plan:  Colonoscopy today      SOFIA Martin Jr., M.D.  [facility]    "

## 2023-02-03 NOTE — ANESTHESIA POSTPROCEDURE EVALUATION
Anesthesia Post Evaluation    Patient: Penny Azul    Procedure(s) Performed: Procedure(s) (LRB):  COLON (N/A)    Final Anesthesia Type: general      Patient location during evaluation: PACU  Patient participation: Yes- Able to Participate  Level of consciousness: awake and alert  Post-procedure vital signs: reviewed and stable  Pain management: adequate  Airway patency: patent    PONV status at discharge: No PONV, nausea (controlled) and vomiting (controlled)  Anesthetic complications: no      Cardiovascular status: blood pressure returned to baseline and stable  Respiratory status: unassisted  Hydration status: euvolemic  Follow-up not needed.          Vitals Value Taken Time   /69 02/03/23 0909   Temp 35.9 °C (96.6 °F) 02/03/23 0838   Pulse 67 02/03/23 0909   Resp 15 02/03/23 0909   SpO2 96 % 02/03/23 0909         No case tracking events are documented in the log.      Pain/Keiry Score: Keiry Score: 10 (2/3/2023  9:07 AM)

## 2023-02-03 NOTE — PROVATION PATIENT INSTRUCTIONS
Discharge Summary/Instructions after an Endoscopic Procedure  Patient Name: Penny Azul  Patient MRN: [id number]  Patient YOB: 1940  Friday, February 3, 2023  Dennis Martin MD  Dear patient,  As a result of recent federal legislation (The Federal Cures Act), you may   receive lab or pathology results from your procedure in your [facility]   account before your physician is able to contact you. Your physician or   their representative will relay the results to you with their   recommendations at their soonest availability.  Thank you,  RESTRICTIONS:  During your procedure today, you received medications for sedation.  These   medications may affect your judgment, balance and coordination.  Therefore,   for 24 hours, you have the following restrictions:   - DO NOT drive a car, operate machinery, make legal/financial decisions,   sign important papers or drink alcohol.    ACTIVITY:  Today: no heavy lifting, straining or running due to procedural   sedation/anesthesia.  The following day: return to full activity including work.  DIET:  Eat and drink normally unless instructed otherwise.     TREATMENT FOR COMMON SIDE EFFECTS:  - Mild abdominal pain, nausea, belching, bloating or excessive gas:  rest,   eat lightly and use a heating pad.  - Sore Throat: treat with throat lozenges and/or gargle with warm salt   water.  - Because air was used during the procedure, expelling large amounts of air   from your rectum or belching is normal.  - If a bowel prep was taken, you may not have a bowel movement for 1-3 days.    This is normal.  SYMPTOMS TO WATCH FOR AND REPORT TO YOUR PHYSICIAN:  1. Abdominal pain or bloating, other than gas cramps.  2. Chest pain.  3. Back pain.  4. Signs of infection such as: chills or fever occurring within 24 hours   after the procedure.  5. Rectal bleeding, which would show as bright red, maroon, or black stools.   (A tablespoon of blood from the rectum is not serious, especially  if   hemorrhoids are present.)  6. Vomiting.  7. Weakness or dizziness.  GO DIRECTLY TO THE NEAREST EMERGENCY ROOM IF YOU HAVE ANY OF THE FOLLOWING:      Difficulty breathing              Chills and/or fever over 101 F   Persistent vomiting and/or vomiting blood   Severe abdominal pain   Severe chest pain   Black, tarry stools   Bleeding- more than one tablespoon   Any other symptom or condition that you feel may need urgent attention  Your doctor recommends these additional instructions:  If any biopsies were taken, your doctors clinic will contact you in 1 to 2   weeks with any results.  - Discharge patient to home (ambulatory).   - Resume previous diet.   - Continue present medications.   - Await pathology results.   - Repeat colonoscopy PRN.  For questions, problems or results please call your physician - Dennis Martin MD at Work:  [phone].  [facility] [place], EMERGENCY ROOM PHONE NUMBER: [phone]  IF A COMPLICATION OR EMERGENCY SITUATION ARISES AND YOU ARE UNABLE TO REACH   YOUR PHYSICIAN - GO DIRECTLY TO THE EMERGENCY ROOM.  Dennis Martin MD  2/3/2023 8:38:03 AM  This report has been verified and signed electronically.  Dear patient,  As a result of recent federal legislation (The Federal Cures Act), you may   receive lab or pathology results from your procedure in your [facility]   account before your physician is able to contact you. Your physician or   their representative will relay the results to you with their   recommendations at their soonest availability.  Thank you,  PROVATION

## 2023-02-06 LAB — PSYCHE PATHOLOGY RESULT: NORMAL

## 2023-04-18 ENCOUNTER — TELEPHONE (OUTPATIENT)
Dept: INTERNAL MEDICINE | Facility: CLINIC | Age: 83
End: 2023-04-18
Payer: MEDICARE

## 2023-06-19 ENCOUNTER — DOCUMENTATION ONLY (OUTPATIENT)
Dept: SURGICAL ONCOLOGY | Facility: CLINIC | Age: 83
End: 2023-06-19
Payer: MEDICARE

## 2023-06-19 DIAGNOSIS — K64.4 ANAL SKIN TAG: Primary | ICD-10-CM

## 2023-06-19 LAB — CRC RECOMMENDATION EXT: NORMAL

## 2023-07-13 ENCOUNTER — OFFICE VISIT (OUTPATIENT)
Dept: SURGICAL ONCOLOGY | Facility: CLINIC | Age: 83
End: 2023-07-13
Payer: MEDICARE

## 2023-07-13 VITALS
WEIGHT: 161.63 LBS | SYSTOLIC BLOOD PRESSURE: 167 MMHG | BODY MASS INDEX: 25.37 KG/M2 | DIASTOLIC BLOOD PRESSURE: 65 MMHG | HEART RATE: 70 BPM | HEIGHT: 67 IN

## 2023-07-13 DIAGNOSIS — K64.4 ANAL SKIN TAG: ICD-10-CM

## 2023-07-13 PROCEDURE — 3288F FALL RISK ASSESSMENT DOCD: CPT | Mod: CPTII,S$GLB,, | Performed by: COLON & RECTAL SURGERY

## 2023-07-13 PROCEDURE — 1126F AMNT PAIN NOTED NONE PRSNT: CPT | Mod: CPTII,S$GLB,, | Performed by: COLON & RECTAL SURGERY

## 2023-07-13 PROCEDURE — 99999 PR PBB SHADOW E&M-EST. PATIENT-LVL III: ICD-10-PCS | Mod: PBBFAC,,, | Performed by: COLON & RECTAL SURGERY

## 2023-07-13 PROCEDURE — 1159F PR MEDICATION LIST DOCUMENTED IN MEDICAL RECORD: ICD-10-PCS | Mod: CPTII,S$GLB,, | Performed by: COLON & RECTAL SURGERY

## 2023-07-13 PROCEDURE — 1101F PR PT FALLS ASSESS DOC 0-1 FALLS W/OUT INJ PAST YR: ICD-10-PCS | Mod: CPTII,S$GLB,, | Performed by: COLON & RECTAL SURGERY

## 2023-07-13 PROCEDURE — 3077F SYST BP >= 140 MM HG: CPT | Mod: CPTII,S$GLB,, | Performed by: COLON & RECTAL SURGERY

## 2023-07-13 PROCEDURE — 3288F PR FALLS RISK ASSESSMENT DOCUMENTED: ICD-10-PCS | Mod: CPTII,S$GLB,, | Performed by: COLON & RECTAL SURGERY

## 2023-07-13 PROCEDURE — 99204 OFFICE O/P NEW MOD 45 MIN: CPT | Mod: 25,S$GLB,, | Performed by: COLON & RECTAL SURGERY

## 2023-07-13 PROCEDURE — 1160F RVW MEDS BY RX/DR IN RCRD: CPT | Mod: CPTII,S$GLB,, | Performed by: COLON & RECTAL SURGERY

## 2023-07-13 PROCEDURE — 1101F PT FALLS ASSESS-DOCD LE1/YR: CPT | Mod: CPTII,S$GLB,, | Performed by: COLON & RECTAL SURGERY

## 2023-07-13 PROCEDURE — 1159F MED LIST DOCD IN RCRD: CPT | Mod: CPTII,S$GLB,, | Performed by: COLON & RECTAL SURGERY

## 2023-07-13 PROCEDURE — 3078F PR MOST RECENT DIASTOLIC BLOOD PRESSURE < 80 MM HG: ICD-10-PCS | Mod: CPTII,S$GLB,, | Performed by: COLON & RECTAL SURGERY

## 2023-07-13 PROCEDURE — 99204 PR OFFICE/OUTPT VISIT, NEW, LEVL IV, 45-59 MIN: ICD-10-PCS | Mod: 25,S$GLB,, | Performed by: COLON & RECTAL SURGERY

## 2023-07-13 PROCEDURE — 1126F PR PAIN SEVERITY QUANTIFIED, NO PAIN PRESENT: ICD-10-PCS | Mod: CPTII,S$GLB,, | Performed by: COLON & RECTAL SURGERY

## 2023-07-13 PROCEDURE — 46220 EXCISE ANAL EXT TAG/PAPILLA: CPT | Mod: S$GLB,,, | Performed by: COLON & RECTAL SURGERY

## 2023-07-13 PROCEDURE — 46220 PR EXCISION SINGLE EXTERNAL PAPILLA OR TAG ANUS: ICD-10-PCS | Mod: S$GLB,,, | Performed by: COLON & RECTAL SURGERY

## 2023-07-13 PROCEDURE — 99999 PR PBB SHADOW E&M-EST. PATIENT-LVL III: CPT | Mod: PBBFAC,,, | Performed by: COLON & RECTAL SURGERY

## 2023-07-13 PROCEDURE — 3077F PR MOST RECENT SYSTOLIC BLOOD PRESSURE >= 140 MM HG: ICD-10-PCS | Mod: CPTII,S$GLB,, | Performed by: COLON & RECTAL SURGERY

## 2023-07-13 PROCEDURE — 1160F PR REVIEW ALL MEDS BY PRESCRIBER/CLIN PHARMACIST DOCUMENTED: ICD-10-PCS | Mod: CPTII,S$GLB,, | Performed by: COLON & RECTAL SURGERY

## 2023-07-13 PROCEDURE — 3078F DIAST BP <80 MM HG: CPT | Mod: CPTII,S$GLB,, | Performed by: COLON & RECTAL SURGERY

## 2023-07-13 NOTE — PROGRESS NOTES
Date of Procedure:  07/13/2023    Procedure:  Excision of anal tag    Pre-Operative Diagnosis:  Anal tag    Post-Operative Diagnosis: Same    Anesthesia: 1% Lidocaine with epinephrine    Estimated Blood Loss (EBL): Less than 10 mL           Specimens: None     Description of Technical Procedures:  After informed consent was obtained, patient was placed in the left lateral decubitus position.  The perianal skin was prepped with Betadine.  1% lidocaine with epinephrine was used for local anesthesia.  The right posterior anal tag was sharply excised with scissors.  Hemostasis was ensured.  A dry gauze dressing was applied and secured with tape.  Patient tolerated procedure well and there were no complications.

## 2023-07-13 NOTE — PROGRESS NOTES
Patient ID: [id number]     Chief Complaint: Skin Problem (Skin tag)      HPI:     Penny Azul is a 82 y.o. female here today for an initial consultation.  She was referred by Dr. Alexandru Martin for management an anal tag.  She says it has been there for years.  Her complaint is that it is unsanitary.  She says that she can not clean because the tag was in the way.  She denies bleeding.  Colonoscopy 02/03/2023 was reviewed and shows many small mouth diverticula in the sigmoid colon.  Random biopsies were taken to rule out microscopic colitis and were unremarkable.    Past Medical History:  has a past medical history of Anxiety, Diarrhea, and Heart murmur.    Surgical History:  has a past surgical history that includes Hysterectomy; Modified radical mastectomy with reconstruction using flap (Bilateral); Appendectomy; left thigh  (Left); Facial cosmetic surgery; Skin reconstructive surgery; Colonoscopy; and colonoscopy, with 1 or more biopsies (N/A, 2/3/2023).    Family History: family history is not on file.    Social History:  reports that she has never smoked. She has never used smokeless tobacco. She reports current alcohol use of about 1.0 standard drink per week. She reports that she does not use drugs.    Current Outpatient Medications   Medication Instructions    alendronate (FOSAMAX) 70 mg, Oral, Every 7 days    cetirizine (ZYRTEC) 10 mg, Oral, Daily    EScitalopram oxalate (LEXAPRO) 20 mg, Oral, Daily    estradioL (VAGIFEM) 10 mcg Tab 1 tablet, Vaginal    melatonin 10 mg Tab 1 tablet, Oral, Nightly    methylphenidate HCl (RITALIN) 10 mg, Oral, Daily PRN    omeprazole (PRILOSEC) 40 mg, Oral, Daily PRN    sod sulf-pot chloride-mag sulf (SUTAB) 1.479-0.188- 0.225 gram tablet 12 tablets, Oral, Daily, Take 1 kit as kit as directed by your physician. Do NOT follow instructions on box.       Patient has No Known Allergies.     Patient Care Team:  Laura Drummond MD as PCP - General (Internal Medicine)  "      Subjective:     Review of Systems    12 point review of systems conducted, negative except as stated in the history of present illness. See HPI for details.      Objective:     Visit Vitals  BP (!) 167/65   Pulse 70   Ht 5' 7" (1.702 m)   Wt 73.3 kg (161 lb 9.6 oz)   BMI 25.31 kg/m²       Physical Exam    General: Alert and oriented, No acute distress.  Head: Normocephalic, Atraumatic.  Eye: Pupils are equal and round, Extraocular movements are intact, Sclera non-icteric.  Ears/Nose/Throat: Normal, Mucosa moist, Clear.  Respiratory: No wheezes, Non-labored respirations, Symmetrical chest wall expansion.  Cardiovascular: Regular rate.  Gastrointestinal: Soft, Non-tender, Non-distended, Normal bowel sounds, No palpable masses.  Rectal: Right posterior residual hemorrhoid tag.  Integumentary: Warm, Dry, Intact, No rashes.  Extremities: No edema.  Neurologic: No focal deficits.  Psychiatric: Normal interaction, Coherent speech, Euthymic mood, Appropriate affect.       Labs Reviewed:     Chemistry:  Lab Results   Component Value Date     04/07/2022    K 4.6 04/07/2022    CHLORIDE 108 04/07/2022    BUN 16.5 04/07/2022    CREATININE 0.65 04/07/2022    GLUCOSE 91 04/07/2022    CALCIUM 9.5 04/07/2022        Hematology:  Lab Results   Component Value Date    WBC 5.4 04/07/2022    HGB 14.7 04/07/2022    HCT 44.7 04/07/2022     04/07/2022         Assessment:       ICD-10-CM ICD-9-CM   1. Anal skin tag  K64.4 455.9        Plan:   - [facility]cise in the office today       No follow-ups on file. In addition to their scheduled follow up, the patient has also been instructed to follow up on as needed basis.     No future appointments.     Alexis Tubbs MD    "

## (undated) DEVICE — UNDERPAD DISPOSABLE 30X30IN

## (undated) DEVICE — TIP SUCTION YANKAUER

## (undated) DEVICE — BLOCK BLOX BITE DENT RIM 54FR

## (undated) DEVICE — FORCEP BPSY RJ LG 2.2MM 240CM

## (undated) DEVICE — KIT CANIST SUCTION 1200CC

## (undated) DEVICE — SOL IRRI STRL WATER 1000ML

## (undated) DEVICE — BAG LABGUARD BIOHAZARD 6X9IN

## (undated) DEVICE — ADAPTER DUAL NSL LUER M-M 7FT

## (undated) DEVICE — CONTAINER SPEC STRL PATH 4OZ

## (undated) DEVICE — KIT SURGICAL COLON .25 1.1OZ

## (undated) DEVICE — COLLECTION SPECIMEN NEPTUNE